# Patient Record
Sex: FEMALE | Race: WHITE | NOT HISPANIC OR LATINO | Employment: OTHER | URBAN - METROPOLITAN AREA
[De-identification: names, ages, dates, MRNs, and addresses within clinical notes are randomized per-mention and may not be internally consistent; named-entity substitution may affect disease eponyms.]

---

## 2024-02-06 ENCOUNTER — OFFICE VISIT (OUTPATIENT)
Dept: NEUROLOGY | Facility: CLINIC | Age: 79
End: 2024-02-06
Payer: MEDICARE

## 2024-02-06 ENCOUNTER — LAB (OUTPATIENT)
Dept: LAB | Facility: HOSPITAL | Age: 79
End: 2024-02-06
Payer: MEDICARE

## 2024-02-06 VITALS
WEIGHT: 158.8 LBS | SYSTOLIC BLOOD PRESSURE: 134 MMHG | OXYGEN SATURATION: 96 % | HEART RATE: 59 BPM | BODY MASS INDEX: 31.18 KG/M2 | DIASTOLIC BLOOD PRESSURE: 82 MMHG | HEIGHT: 60 IN

## 2024-02-06 DIAGNOSIS — R41.3 MEMORY LOSS: Primary | ICD-10-CM

## 2024-02-06 DIAGNOSIS — I67.858 OTHER HEREDITARY CEREBROVASCULAR DISEASE: ICD-10-CM

## 2024-02-06 DIAGNOSIS — R41.3 MEMORY LOSS: ICD-10-CM

## 2024-02-06 LAB
ALBUMIN SERPL-MCNC: 4.1 G/DL (ref 3.5–5.2)
ALBUMIN/GLOB SERPL: 1.5 G/DL
ALP SERPL-CCNC: 74 U/L (ref 39–117)
ALT SERPL W P-5'-P-CCNC: 12 U/L (ref 1–33)
ANION GAP SERPL CALCULATED.3IONS-SCNC: 12 MMOL/L (ref 5–15)
AST SERPL-CCNC: 20 U/L (ref 1–32)
BILIRUB SERPL-MCNC: 0.3 MG/DL (ref 0–1.2)
BUN SERPL-MCNC: 17 MG/DL (ref 8–23)
BUN/CREAT SERPL: 16.8 (ref 7–25)
CALCIUM SPEC-SCNC: 9.4 MG/DL (ref 8.6–10.5)
CHLORIDE SERPL-SCNC: 106 MMOL/L (ref 98–107)
CO2 SERPL-SCNC: 23 MMOL/L (ref 22–29)
CREAT SERPL-MCNC: 1.01 MG/DL (ref 0.57–1)
EGFRCR SERPLBLD CKD-EPI 2021: 57.5 ML/MIN/1.73
GLOBULIN UR ELPH-MCNC: 2.8 GM/DL
GLUCOSE SERPL-MCNC: 86 MG/DL (ref 65–99)
HCYS SERPL-MCNC: 12.3 UMOL/L (ref 0–15)
POTASSIUM SERPL-SCNC: 4.4 MMOL/L (ref 3.5–5.2)
PROT SERPL-MCNC: 6.9 G/DL (ref 6–8.5)
SODIUM SERPL-SCNC: 141 MMOL/L (ref 136–145)
T4 FREE SERPL-MCNC: 1.26 NG/DL (ref 0.93–1.7)
TSH SERPL DL<=0.05 MIU/L-ACNC: 2.68 UIU/ML (ref 0.27–4.2)
VIT B12 BLD-MCNC: 787 PG/ML (ref 211–946)

## 2024-02-06 PROCEDURE — 80053 COMPREHEN METABOLIC PANEL: CPT

## 2024-02-06 PROCEDURE — 1159F MED LIST DOCD IN RCRD: CPT | Performed by: PSYCHIATRY & NEUROLOGY

## 2024-02-06 PROCEDURE — 84439 ASSAY OF FREE THYROXINE: CPT | Performed by: PSYCHIATRY & NEUROLOGY

## 2024-02-06 PROCEDURE — 99205 OFFICE O/P NEW HI 60 MIN: CPT | Performed by: PSYCHIATRY & NEUROLOGY

## 2024-02-06 PROCEDURE — 36415 COLL VENOUS BLD VENIPUNCTURE: CPT

## 2024-02-06 PROCEDURE — 84443 ASSAY THYROID STIM HORMONE: CPT | Performed by: PSYCHIATRY & NEUROLOGY

## 2024-02-06 PROCEDURE — 82607 VITAMIN B-12: CPT

## 2024-02-06 PROCEDURE — 83921 ORGANIC ACID SINGLE QUANT: CPT

## 2024-02-06 PROCEDURE — 83090 ASSAY OF HOMOCYSTEINE: CPT

## 2024-02-06 PROCEDURE — 1160F RVW MEDS BY RX/DR IN RCRD: CPT | Performed by: PSYCHIATRY & NEUROLOGY

## 2024-02-06 RX ORDER — MAGNESIUM OXIDE 400 MG/1
400 TABLET ORAL AS NEEDED
COMMUNITY

## 2024-02-06 RX ORDER — OMEPRAZOLE 20 MG/1
20 CAPSULE, DELAYED RELEASE ORAL TAKE AS DIRECTED
COMMUNITY

## 2024-02-06 RX ORDER — IBUPROFEN 200 MG
200 TABLET ORAL EVERY 6 HOURS PRN
COMMUNITY

## 2024-02-06 RX ORDER — SODIUM PHOSPHATE,MONO-DIBASIC 19G-7G/118
ENEMA (ML) RECTAL
COMMUNITY

## 2024-02-06 RX ORDER — PHYTONADIONE 5 MG/1
5 TABLET ORAL ONCE
COMMUNITY

## 2024-02-06 NOTE — PROGRESS NOTES
Notes by LPN:  Patient referred for memory loss and abnormal MRI.  Patient brought , Ramone, with her and she gives permission to discuss her medical information with him. Patient forgetting important things.             Subjective:     Dear Dr. Barreto, thank you for referring Mrs. Waller for consultation.  As you know, she is a delightful 78-year-old woman sent for evaluation of cognitive complaints and memory problems.  She is accompanied by her  to whom she is given permission to provide additional details.  They tell me that she has slowly developed difficulty with recall, initially for names and now beginning to lose important things such as her 's license or her keys.  There is no clear family history of dementia.  She still able to handle her own bills and medications and the patient is the one primarily concerned about her memory.  She says that her children have not really noticed and her  is only noticed to some degree.  He does help her had a little bit more and remembering things that he used to.  Her recent brain MRI from November 27, 2023 revealed no acute process.  Chronic small vessel disease consistent with the patient's age was also noted.  Patient ID: Baldemar Waller is a 78 y.o. female.    Memory Loss  Associated symptoms include arthralgias, fatigue and myalgias. Pertinent negatives include no abdominal pain, chest pain, headaches, joint swelling, nausea, neck pain, numbness, vomiting or weakness.     The following portions of the patient's history were reviewed and updated as appropriate: allergies, current medications, past family history, past medical history, past social history, past surgical history, and problem list.    Review of Systems   Constitutional:  Positive for fatigue. Negative for activity change and appetite change.   HENT:  Positive for hearing loss. Negative for facial swelling, trouble swallowing and voice change.    Eyes:  Negative for photophobia,  pain and visual disturbance.   Respiratory:  Positive for shortness of breath. Negative for chest tightness and wheezing.    Cardiovascular:  Negative for chest pain, palpitations and leg swelling.   Gastrointestinal:  Negative for abdominal pain, nausea and vomiting.   Endocrine: Negative for cold intolerance and heat intolerance.   Musculoskeletal:  Positive for arthralgias, myalgias and neck stiffness. Negative for back pain, gait problem, joint swelling and neck pain.   Neurological:  Positive for speech difficulty. Negative for dizziness, tremors, seizures, syncope, facial asymmetry, weakness, light-headedness, numbness and headaches.   Hematological:  Bruises/bleeds easily.   Psychiatric/Behavioral:  Positive for agitation, decreased concentration and sleep disturbance. Negative for behavioral problems, confusion, dysphoric mood, hallucinations, self-injury and suicidal ideas. The patient is not nervous/anxious and is not hyperactive.         Objective:    Neurologic Exam  Awake alert pleasant cooperative, normal fluent speech and normal speech comprehension.  She scored 26 out of 30 on the [place] today.  Her deficits primarily fell in delayed recall and visuospatial function.    Cranial nerves II through XII normal and symmetric.    Motor exam reveals normal symmetric tone bulk and power appropriate for age and activity level.    Sensory exam reveals symmetric sensation to light touch, temperature, vibration throughout.    Coordination testing reveals smooth and accurate finger-nose-finger with no past-pointing.  No dysdiadochokinesis.  Gait is unremarkable with no parkinsonian features.  Romberg testing is negative.    Tendon reflexes symmetric and nonpathological throughout.  Toes are downgoing.  Physical Exam  Neck is supple.  No cervical bruits.  Cardiac rhythm is regular.  Assessment/Plan:     Diagnoses and all orders for this visit:    1. Memory loss (Primary)  -     Comprehensive Metabolic Panel;  Future  -     TSH+Free T4  -     Vitamin B12; Future  -     Methylmalonic Acid, Serum; Future  -     Homocysteine, serum; Future    2. Other hereditary cerebrovascular disease  -     Homocysteine, serum; Future       78-year-old woman with memory complaints.  I reassured her that I do not see clear evidence of an underlying neurodegenerative disorder at this point.  I suspect the most likely contributions to her complaints are normal age-related memory loss along with insomnia and depression.  I have taken the liberty of expanding her laboratory workup to exclude modifiable causes.  Her brain MRI was reassuringly unremarkable (according to the radiology report) only revealing age-appropriate chronic microvascular changes.  I will review her laboratory workup as it evolves and take any further measures that may be necessary but otherwise we will see her back in clinic in 6 months to recheck her cognitive screens for comparison to today's testing.  I discussed all the above in detail with the patient and with her  and answered their extensive questions today.  Thank you for the opportunity to participate in the care of this delightful woman.    65 minutes total patient care time including record review, examination, discussion, counseling, , and documentation.           here for evaluation of memory complaints.

## 2024-02-09 ENCOUNTER — TELEPHONE (OUTPATIENT)
Dept: NEUROLOGY | Facility: CLINIC | Age: 79
End: 2024-02-09
Payer: MEDICARE

## 2024-02-09 NOTE — TELEPHONE ENCOUNTER
----- Message from Shabbir Rojas MD sent at 2/9/2024  1:10 PM EST -----  Lab tests all look good.  No treatable source for memory loss found on these tests.

## 2024-02-12 ENCOUNTER — PATIENT ROUNDING (BHMG ONLY) (OUTPATIENT)
Dept: NEUROLOGY | Facility: CLINIC | Age: 79
End: 2024-02-12
Payer: MEDICARE

## 2024-02-12 ENCOUNTER — PATIENT MESSAGE (OUTPATIENT)
Dept: NEUROLOGY | Facility: CLINIC | Age: 79
End: 2024-02-12
Payer: MEDICARE

## 2024-02-12 LAB — METHYLMALONATE SERPL-SCNC: 180 NMOL/L (ref 0–378)

## 2024-08-06 ENCOUNTER — OFFICE VISIT (OUTPATIENT)
Dept: NEUROLOGY | Facility: CLINIC | Age: 79
End: 2024-08-06
Payer: MEDICARE

## 2024-08-06 ENCOUNTER — LAB (OUTPATIENT)
Dept: LAB | Facility: HOSPITAL | Age: 79
End: 2024-08-06
Payer: MEDICARE

## 2024-08-06 VITALS
HEART RATE: 66 BPM | DIASTOLIC BLOOD PRESSURE: 80 MMHG | BODY MASS INDEX: 30 KG/M2 | WEIGHT: 153.6 LBS | SYSTOLIC BLOOD PRESSURE: 120 MMHG | OXYGEN SATURATION: 97 %

## 2024-08-06 DIAGNOSIS — G31.84 MCI (MILD COGNITIVE IMPAIRMENT): ICD-10-CM

## 2024-08-06 DIAGNOSIS — R41.3 MEMORY LOSS: Primary | ICD-10-CM

## 2024-08-06 DIAGNOSIS — G31.84 MCI (MILD COGNITIVE IMPAIRMENT): Primary | ICD-10-CM

## 2024-08-06 PROCEDURE — 83520 IMMUNOASSAY QUANT NOS NONAB: CPT

## 2024-08-06 PROCEDURE — 1159F MED LIST DOCD IN RCRD: CPT | Performed by: PSYCHIATRY & NEUROLOGY

## 2024-08-06 PROCEDURE — 1160F RVW MEDS BY RX/DR IN RCRD: CPT | Performed by: PSYCHIATRY & NEUROLOGY

## 2024-08-06 PROCEDURE — 99214 OFFICE O/P EST MOD 30 MIN: CPT | Performed by: PSYCHIATRY & NEUROLOGY

## 2024-08-06 PROCEDURE — 36415 COLL VENOUS BLD VENIPUNCTURE: CPT

## 2024-08-06 NOTE — PROGRESS NOTES
Notes by MA:  Pt is here today for a follow up appointment for memory concerns.      Subjective:     Patient ID: Baldemar Waller is a 78 y.o. female.    History of Present Illness  The following portions of the patient's history were reviewed and updated as appropriate: allergies, current medications, past family history, past medical history, past social history, past surgical history, and problem list.    Review of Systems     Objective:    Neurologic Exam  Awake alert pleasant cooperative, normal fluent speech and normal speech comprehension.  She scored 29 out of 30 on the [place] today.       Cranial nerves II through XII normal and symmetric.     Motor exam reveals normal symmetric tone bulk and power appropriate for age and activity level.     Sensory exam reveals symmetric sensation to light touch, temperature, vibration throughout.     Coordination testing reveals smooth and accurate finger-nose-finger with no past-pointing.  No dysdiadochokinesis.  Gait is unremarkable with no parkinsonian features.  Romberg testing is negative.     Tendon reflexes symmetric and nonpathological throughout.  Toes are downgoing.  Physical Exam    Assessment/Plan:     Diagnoses and all orders for this visit:    1. Memory loss (Primary)       Memory is fairly stable since her last visit.  Cognitive screens are little bit better since her last visit.  This is reassuring and this suggests that this may simply represent normal age-related memory loss.  She still has some lingering concerns though and I think arranging for blood biomarker screening for possible underlying Alzheimer pathology would be reasonable.  We will arrange for that for her.  I will review the results and we will take next appropriate steps at that time.  In the meantime, we discussed improving sleep habits, regular physical exercise, cognitive exercise, and a heart healthy diet.  Otherwise we will see her back in 6 months for repeat cognitive screens.

## 2024-08-14 LAB
A -- BETA-AMYLOID 42/40 RATIO: 0.13
AL BETA40 PLAS-MCNC: 223.37 PG/ML
AL BETA42 PLAS-MCNC: 28.47 PG/ML
ATN SUMMARY1: ABNORMAL
INFORMATION:: ABNORMAL
N -- NFL, PLASMA: 4.9 PG/ML (ref 0–7.64)
T -- P-TAU181: 0.98 PG/ML (ref 0–0.97)

## 2024-08-22 ENCOUNTER — TELEPHONE (OUTPATIENT)
Dept: NEUROLOGY | Facility: CLINIC | Age: 79
End: 2024-08-22
Payer: MEDICARE

## 2024-09-13 ENCOUNTER — TELEPHONE (OUTPATIENT)
Dept: NEUROLOGY | Facility: CLINIC | Age: 79
End: 2024-09-13
Payer: MEDICARE

## 2024-09-14 ENCOUNTER — PATIENT MESSAGE (OUTPATIENT)
Dept: NEUROLOGY | Facility: CLINIC | Age: 79
End: 2024-09-14
Payer: MEDICARE

## 2024-09-20 ENCOUNTER — OFFICE VISIT (OUTPATIENT)
Dept: NEUROLOGY | Facility: CLINIC | Age: 79
End: 2024-09-20
Payer: MEDICARE

## 2024-09-20 VITALS
DIASTOLIC BLOOD PRESSURE: 80 MMHG | BODY MASS INDEX: 30.12 KG/M2 | HEART RATE: 66 BPM | WEIGHT: 154.2 LBS | OXYGEN SATURATION: 96 % | SYSTOLIC BLOOD PRESSURE: 140 MMHG

## 2024-09-20 DIAGNOSIS — R41.3 MEMORY LOSS: Primary | ICD-10-CM

## 2024-09-20 PROCEDURE — 1160F RVW MEDS BY RX/DR IN RCRD: CPT | Performed by: PSYCHIATRY & NEUROLOGY

## 2024-09-20 PROCEDURE — 1159F MED LIST DOCD IN RCRD: CPT | Performed by: PSYCHIATRY & NEUROLOGY

## 2024-09-20 PROCEDURE — 99214 OFFICE O/P EST MOD 30 MIN: CPT | Performed by: PSYCHIATRY & NEUROLOGY
